# Patient Record
Sex: FEMALE | Race: WHITE
[De-identification: names, ages, dates, MRNs, and addresses within clinical notes are randomized per-mention and may not be internally consistent; named-entity substitution may affect disease eponyms.]

---

## 2023-01-24 ENCOUNTER — HOSPITAL ENCOUNTER (OUTPATIENT)
Dept: HOSPITAL 47 - EC | Age: 53
Setting detail: OBSERVATION
LOS: 1 days | Discharge: HOME | End: 2023-01-25
Attending: UROLOGY | Admitting: UROLOGY
Payer: MEDICARE

## 2023-01-24 VITALS — BODY MASS INDEX: 18.3 KG/M2

## 2023-01-24 VITALS — RESPIRATION RATE: 16 BRPM

## 2023-01-24 DIAGNOSIS — Z87.440: ICD-10-CM

## 2023-01-24 DIAGNOSIS — Z88.0: ICD-10-CM

## 2023-01-24 DIAGNOSIS — N13.6: Primary | ICD-10-CM

## 2023-01-24 DIAGNOSIS — F17.200: ICD-10-CM

## 2023-01-24 LAB
ALBUMIN SERPL-MCNC: 3.8 G/DL (ref 3.5–5)
ALP SERPL-CCNC: 134 U/L (ref 38–126)
ALT SERPL-CCNC: 17 U/L (ref 4–34)
ANION GAP SERPL CALC-SCNC: 11 MMOL/L
AST SERPL-CCNC: 17 U/L (ref 14–36)
BASOPHILS # BLD AUTO: 0.1 K/UL (ref 0–0.2)
BASOPHILS NFR BLD AUTO: 0 %
BUN SERPL-SCNC: 27 MG/DL (ref 7–17)
CALCIUM SPEC-MCNC: 9.2 MG/DL (ref 8.4–10.2)
CHLORIDE SERPL-SCNC: 102 MMOL/L (ref 98–107)
CO2 SERPL-SCNC: 24 MMOL/L (ref 22–30)
EOSINOPHIL # BLD AUTO: 0.2 K/UL (ref 0–0.7)
EOSINOPHIL NFR BLD AUTO: 1 %
ERYTHROCYTE [DISTWIDTH] IN BLOOD BY AUTOMATED COUNT: 4.21 M/UL (ref 3.8–5.4)
ERYTHROCYTE [DISTWIDTH] IN BLOOD: 12.4 % (ref 11.5–15.5)
GLUCOSE SERPL-MCNC: 109 MG/DL (ref 74–99)
HCT VFR BLD AUTO: 37.2 % (ref 34–46)
HGB BLD-MCNC: 12.5 GM/DL (ref 11.4–16)
KETONES UR QL STRIP.AUTO: (no result)
LIPASE SERPL-CCNC: 84 U/L (ref 23–300)
LYMPHOCYTES # SPEC AUTO: 1.1 K/UL (ref 1–4.8)
LYMPHOCYTES NFR SPEC AUTO: 6 %
MCH RBC QN AUTO: 29.8 PG (ref 25–35)
MCHC RBC AUTO-ENTMCNC: 33.7 G/DL (ref 31–37)
MCV RBC AUTO: 88.5 FL (ref 80–100)
MONOCYTES # BLD AUTO: 0.9 K/UL (ref 0–1)
MONOCYTES NFR BLD AUTO: 5 %
NEUTROPHILS # BLD AUTO: 14.5 K/UL (ref 1.3–7.7)
NEUTROPHILS NFR BLD AUTO: 86 %
PH UR: 7.5 [PH] (ref 5–8)
PLATELET # BLD AUTO: 323 K/UL (ref 150–450)
POTASSIUM SERPL-SCNC: 4.7 MMOL/L (ref 3.5–5.1)
PROT SERPL-MCNC: 6.9 G/DL (ref 6.3–8.2)
PROT UR QL: (no result)
RBC UR QL: >182 /HPF (ref 0–5)
SODIUM SERPL-SCNC: 137 MMOL/L (ref 137–145)
SP GR UR: 1.02 (ref 1–1.03)
SQUAMOUS UR QL AUTO: 17 /HPF (ref 0–4)
UROBILINOGEN UR QL STRIP: <2 MG/DL (ref ?–2)
WBC # BLD AUTO: 16.8 K/UL (ref 3.8–10.6)
WBC # UR AUTO: >182 /HPF (ref 0–5)

## 2023-01-24 PROCEDURE — 96365 THER/PROPH/DIAG IV INF INIT: CPT

## 2023-01-24 PROCEDURE — 74176 CT ABD & PELVIS W/O CONTRAST: CPT

## 2023-01-24 PROCEDURE — 85025 COMPLETE CBC W/AUTO DIFF WBC: CPT

## 2023-01-24 PROCEDURE — 96375 TX/PRO/DX INJ NEW DRUG ADDON: CPT

## 2023-01-24 PROCEDURE — 96361 HYDRATE IV INFUSION ADD-ON: CPT

## 2023-01-24 PROCEDURE — 36415 COLL VENOUS BLD VENIPUNCTURE: CPT

## 2023-01-24 PROCEDURE — 87186 SC STD MICRODIL/AGAR DIL: CPT

## 2023-01-24 PROCEDURE — 87077 CULTURE AEROBIC IDENTIFY: CPT

## 2023-01-24 PROCEDURE — 87086 URINE CULTURE/COLONY COUNT: CPT

## 2023-01-24 PROCEDURE — 81001 URINALYSIS AUTO W/SCOPE: CPT

## 2023-01-24 PROCEDURE — 96376 TX/PRO/DX INJ SAME DRUG ADON: CPT

## 2023-01-24 PROCEDURE — 80053 COMPREHEN METABOLIC PANEL: CPT

## 2023-01-24 PROCEDURE — 83690 ASSAY OF LIPASE: CPT

## 2023-01-24 PROCEDURE — 99285 EMERGENCY DEPT VISIT HI MDM: CPT

## 2023-01-24 PROCEDURE — 83605 ASSAY OF LACTIC ACID: CPT

## 2023-01-24 PROCEDURE — 87040 BLOOD CULTURE FOR BACTERIA: CPT

## 2023-01-24 RX ADMIN — KETOROLAC TROMETHAMINE PRN MG: 15 INJECTION, SOLUTION INTRAMUSCULAR; INTRAVENOUS at 23:36

## 2023-01-24 RX ADMIN — CEFAZOLIN SCH MLS/HR: 330 INJECTION, POWDER, FOR SOLUTION INTRAMUSCULAR; INTRAVENOUS at 23:36

## 2023-01-24 NOTE — CT
EXAMINATION TYPE: CT abdomen pelvis wo con

CT DLP: 272.4 mGycm, Automated exposure control for dose reduction was used.

 

DATE OF EXAM: 1/24/2023 9:28 PM

 

COMPARISON: None

 

CLINICAL INDICATION:Female, 52 years old with history of abdominal pain; left sided flank pain

 

TECHNIQUE:  Axial CT of the abdomen and pelvis. Sagittal and coronal reformats were created on a [employer] workstation. 

 

Contrast used: None

Oral contrast used: without Oral Contrast 

 

FINDINGS: 

LOWER CHEST: Left lower lobe calcified granuloma calcified granuloma

The esophagus in the lower mediastinum.

 

ABDOMEN

LIVER: Unremarkable

GALLBLADDER AND BILE DUCTS: The gallbladder surgically absent.

PANCREAS: Unremarkable.

SPLEEN: Unremarkable.

ADRENAL GLANDS: Unremarkable.

KIDNEYS AND URETERS: Staghorn calculus within the left renal pelvis extending into the lower calyces.
 There is associated mild to moderate dilation of the renal pelvis and calyces secondary to the large
 staghorn calculus in the renal pelvis.

 

Right kidney demonstrates no evidence for calculus or hydronephrosis.

 

PELVIS

BLADDER: Unremarkable

REPRODUCTIVE: Unremarkable.

 

ABDOMEN & PELVIS

STOMACH AND BOWEL: No evidence of bowel obstruction. 

PERITONEUM/RETROPERITONEUM: No evidence of pneumoperitoneum or free fluid.

.

VASCULATURE: No evidence of aortic aneurysm. 

MUSCULOSKELETAL: No acute osseous abnormalities, levoscoliosis apex L2.

LYMPH NODES: No gross evidence for lymphadenopathy.

SOFT TISSUE/ABDOMINAL WALL: Unremarkable

 

IMPRESSION:

Left staghorn calculus with mild to moderate left hydronephrosis predominately of the calyces. Urolog
ic consultation recommended

## 2023-01-24 NOTE — ED
Abdominal Pain HPI





- General


Chief Complaint: Abdominal Pain


Stated Complaint: Poss kidney stones


Time Seen by Provider: 01/24/23 20:31


Source: patient, RN notes reviewed


Mode of arrival: ambulatory


Limitations: no limitations





- History of Present Illness


Initial Comments: 


52-year-old female presents emergency department chief complaint left flank 

pain.  Patient states started approximately 10 days ago has severely worsened 

states that she cannot keep anything down.  Patient states symptoms are 

worsening daily and is are unremarkable this.  Patient denies any change in 

bowel habits states that she's had some change in color or urine.  Patient state

s pain rates her back always down into her groin.  No fevers or chills no chest 

pain.








- Related Data


                                    Allergies











Allergy/AdvReac Type Severity Reaction Status Date / Time


 


Penicillins Allergy  Vomiting Verified 01/24/23 20:26














Review of Systems


ROS Statement: 


Those systems with pertinent positive or pertinent negative responses have been 

documented in the HPI.





ROS Other: All systems not noted in ROS Statement are negative.





Past Medical History


Past Medical History: No Reported History


Past Surgical History: No Surgical Hx Reported


Past Psychological History: No Psychological Hx Reported


Smoking Status: Current every day smoker


Past Alcohol Use History: None Reported


Past Drug Use History: None Reported





General Exam


Limitations: no limitations


General appearance: alert, in no apparent distress


Head exam: Present: atraumatic, normocephalic, normal inspection


Eye exam: Present: normal appearance, PERRL, EOMI.  Absent: scleral icterus, 

conjunctival injection, periorbital swelling


ENT exam: Present: normal exam, normal oropharynx, mucous membranes moist


Neck exam: Present: normal inspection, full ROM.  Absent: tenderness, 

meningismus, lymphadenopathy


Respiratory exam: Present: normal lung sounds bilaterally.  Absent: respiratory 

distress, wheezes, rales, rhonchi, stridor


Cardiovascular Exam: Present: regular rate, normal rhythm, normal heart sounds. 

Absent: systolic murmur, diastolic murmur, rubs, gallop, clicks


GI/Abdominal exam: Present: soft, tenderness, normal bowel sounds.  Absent: 

distended, guarding, rebound, rigid


Back exam: Present: CVA tenderness (L)


Neurological exam: Present: alert


Skin exam: Present: warm, dry, intact, normal color.  Absent: rash





Course


                                   Vital Signs











  01/24/23





  20:23


 


Temperature 97.4 F L


 


Pulse Rate 100


 


Respiratory 20





Rate 


 


Blood Pressure 129/84


 


O2 Sat by Pulse 98





Oximetry 














Medical Decision Making





- Medical Decision Making


Was pt. sent in by a medical professional or institution (, PA, NP, urgent 

care, hospital, or nursing home...) When possible be specific


@  -[No]


Did you speak to anyone other than the patient for history (EMS, parent, family,

 police, friend...)? What history was obtained from this source 


@  -[No]


Did you review nursing and triage notes (agree or disagree)?  Why? 


@  -[I reviewed and agree with nursing and triage notes]


Were old charts reviewed (outside hosp., previous admission, EMS record, old 

EKG, old radiological studies, urgent care reports/EKG's, nursing home records)?

Report findings 


@  -[No old charts were reviewed]


Differential Diagnosis (chest pain, altered mental status, abdominal pain women,

abdominal pain men, vaginal bleeding, weakness, fever, dyspnea, syncope, 

headache, dizziness, GI bleed, back pain, seizure, CVA, palpatations, mental 

health)? 


@  -[Pyelonephritis UTI, kidney stone, colitis, this list is not all inclusive.]


EKG interpreted by me (3pts min.).


@  -[None]


X-rays interpreted by me (1pt min.).


@  -[None done]


CT interpreted by me (1pt min.).


@  -[CT shows large staghorn telecast left kidney with hydronephrosis]


U/S interpreted by me (1pt. min.).


@  -[None done]


What testing was considered but not performed or refused? (CT, X-rays, U/S, 

labs)? Why?


@  -[None]


What meds were considered but not given or refused? Why?


@  -[None]


Did you discuss the management of the patient with other professionals 

(professionals i.e. , PA, NP, lab, RT, psych nurse, , , 

teacher, , )? Give summary


@  -[Neurology Dr. Lopez]


Was smoking cessation discussed for >3mins.?


@  -[No]


Was critical care preformed (if so, how long)?


@  -[No]


Were there social determinants of health that impacted care today? How? 

(Homelessness, low income, unemployed, alcoholism, drug addiction, 

transportation, low edu. Level, literacy, decrease access to med. care, intermediate, 

rehab)?


@  -[No]


Was there de-escalation of care discussed even if they declined (Discuss DNR or 

withdrawal of care, Hospice)? DNR status


@  -[No]


What co-morbidities impacted this encounter? (DM, HTN, Smoking, COPD, CAD, 

Cancer, CVA, ARF, Chemo, Hep., AIDS, mental health diagnosis, sleep apnea, 

morbid obesity)?


@  -[None]


Was patient admitted / discharged? Hospital course, mention meds given and 

route, prescriptions, significant lab abnormalities, going to OR and other 

pertinent info.


@  -[Admitted patient has evidence of large staghorn calculus with 

hydronephrosis, UTI moderate leukocytosis and subjective fever concerns for 

possible septic stone, pyelonephritis.] 


Undiagnosed new problem with uncertain prognosis?


@  -[No]


Drug Therapy requiring intensive monitoring for toxicity (Heparin, Nitro, 

Insulin, Cardizem)?


@  -[No]


Were any procedures done?


@  -[No]


Diagnosis/symptom?


@  -[Septic kidney stone]


Acute, or Chronic, or Acute on Chronic?


@  -[Acute]


Uncomplicated (without systemic symptoms) or Complicated (systemic symptoms)?


@  -[Complicated]


Side effects of treatment?


@  -[No]


Exacerbation, Progression, or Severe Exacerbation?


@  -[No]


Poses a threat to life or bodily function? How? (Chest pain, USA, MI, pneumonia,

 PE, COPD, DKA, ARF, appy, cholecystitis, CVA, Diverticulitis, Homicidal, 

Suicidal, threat to staff... and all critical care pts)


@  -[Yes patient has complicated kidney infection with kidney stone]








- Lab Data


Result diagrams: 


                                 01/24/23 21:06





                                 01/24/23 21:06


                                   Lab Results











  01/24/23 01/24/23 01/24/23 Range/Units





  21:06 21:06 21:06 


 


WBC  16.8 H    (3.8-10.6)  k/uL


 


RBC  4.21    (3.80-5.40)  m/uL


 


Hgb  12.5    (11.4-16.0)  gm/dL


 


Hct  37.2    (34.0-46.0)  %


 


MCV  88.5    (80.0-100.0)  fL


 


MCH  29.8    (25.0-35.0)  pg


 


MCHC  33.7    (31.0-37.0)  g/dL


 


RDW  12.4    (11.5-15.5)  %


 


Plt Count  323    (150-450)  k/uL


 


MPV  8.1    


 


Neutrophils %  86    %


 


Lymphocytes %  6    %


 


Monocytes %  5    %


 


Eosinophils %  1    %


 


Basophils %  0    %


 


Neutrophils #  14.5 H    (1.3-7.7)  k/uL


 


Lymphocytes #  1.1    (1.0-4.8)  k/uL


 


Monocytes #  0.9    (0-1.0)  k/uL


 


Eosinophils #  0.2    (0-0.7)  k/uL


 


Basophils #  0.1    (0-0.2)  k/uL


 


Sodium   137   (137-145)  mmol/L


 


Potassium   4.7   (3.5-5.1)  mmol/L


 


Chloride   102   ()  mmol/L


 


Carbon Dioxide   24   (22-30)  mmol/L


 


Anion Gap   11   mmol/L


 


BUN   27 H   (7-17)  mg/dL


 


Creatinine   0.98   (0.52-1.04)  mg/dL


 


Est GFR (CKD-EPI)AfAm   77   (>60 ml/min/1.73 sqM)  


 


Est GFR (CKD-EPI)NonAf   67   (>60 ml/min/1.73 sqM)  


 


Glucose   109 H   (74-99)  mg/dL


 


Plasma Lactic Acid Cosme    0.9  (0.7-2.0)  mmol/L


 


Calcium   9.2   (8.4-10.2)  mg/dL


 


Total Bilirubin   0.6   (0.2-1.3)  mg/dL


 


AST   17   (14-36)  U/L


 


ALT   17   (4-34)  U/L


 


Alkaline Phosphatase   134 H   ()  U/L


 


Total Protein   6.9   (6.3-8.2)  g/dL


 


Albumin   3.8   (3.5-5.0)  g/dL


 


Lipase   84   ()  U/L


 


Urine Color     


 


Urine Appearance     (Clear)  


 


Urine pH     (5.0-8.0)  


 


Ur Specific Gravity     (1.001-1.035)  


 


Urine Protein     (Negative)  


 


Urine Glucose (UA)     (Negative)  


 


Urine Ketones     (Negative)  


 


Urine Blood     (Negative)  


 


Urine Nitrite     (Negative)  


 


Urine Bilirubin     (Negative)  


 


Urine Urobilinogen     (<2.0)  mg/dL


 


Ur Leukocyte Esterase     (Negative)  


 


Urine RBC     (0-5)  /hpf


 


Urine WBC     (0-5)  /hpf


 


Urine WBC Clumps     (None)  /hpf


 


Ur Squamous Epith Cells     (0-4)  /hpf


 


Urine Bacteria     (None)  /hpf


 


Urine Mucus     (None)  /hpf














  01/24/23 Range/Units





  22:13 


 


WBC   (3.8-10.6)  k/uL


 


RBC   (3.80-5.40)  m/uL


 


Hgb   (11.4-16.0)  gm/dL


 


Hct   (34.0-46.0)  %


 


MCV   (80.0-100.0)  fL


 


MCH   (25.0-35.0)  pg


 


MCHC   (31.0-37.0)  g/dL


 


RDW   (11.5-15.5)  %


 


Plt Count   (150-450)  k/uL


 


MPV   


 


Neutrophils %   %


 


Lymphocytes %   %


 


Monocytes %   %


 


Eosinophils %   %


 


Basophils %   %


 


Neutrophils #   (1.3-7.7)  k/uL


 


Lymphocytes #   (1.0-4.8)  k/uL


 


Monocytes #   (0-1.0)  k/uL


 


Eosinophils #   (0-0.7)  k/uL


 


Basophils #   (0-0.2)  k/uL


 


Sodium   (137-145)  mmol/L


 


Potassium   (3.5-5.1)  mmol/L


 


Chloride   ()  mmol/L


 


Carbon Dioxide   (22-30)  mmol/L


 


Anion Gap   mmol/L


 


BUN   (7-17)  mg/dL


 


Creatinine   (0.52-1.04)  mg/dL


 


Est GFR (CKD-EPI)AfAm   (>60 ml/min/1.73 sqM)  


 


Est GFR (CKD-EPI)NonAf   (>60 ml/min/1.73 sqM)  


 


Glucose   (74-99)  mg/dL


 


Plasma Lactic Acid Cosme   (0.7-2.0)  mmol/L


 


Calcium   (8.4-10.2)  mg/dL


 


Total Bilirubin   (0.2-1.3)  mg/dL


 


AST   (14-36)  U/L


 


ALT   (4-34)  U/L


 


Alkaline Phosphatase   ()  U/L


 


Total Protein   (6.3-8.2)  g/dL


 


Albumin   (3.5-5.0)  g/dL


 


Lipase   ()  U/L


 


Urine Color  Yellow  


 


Urine Appearance  Turbid H  (Clear)  


 


Urine pH  7.5  (5.0-8.0)  


 


Ur Specific Gravity  1.022  (1.001-1.035)  


 


Urine Protein  3+ H  (Negative)  


 


Urine Glucose (UA)  Negative  (Negative)  


 


Urine Ketones  2+ H  (Negative)  


 


Urine Blood  Large H  (Negative)  


 


Urine Nitrite  Positive H  (Negative)  


 


Urine Bilirubin  Negative  (Negative)  


 


Urine Urobilinogen  <2.0  (<2.0)  mg/dL


 


Ur Leukocyte Esterase  Large H  (Negative)  


 


Urine RBC  >182 H  (0-5)  /hpf


 


Urine WBC  >182 H  (0-5)  /hpf


 


Urine WBC Clumps  Many H  (None)  /hpf


 


Ur Squamous Epith Cells  17 H  (0-4)  /hpf


 


Urine Bacteria  Many H  (None)  /hpf


 


Urine Mucus  Few H  (None)  /hpf














Disposition


Clinical Impression: 


 UTI (urinary tract infection), Staghorn renal calculus, Leukocytosis





Disposition: ADMITTED AS IP TO THIS HOSP


Condition: Fair


Referrals: 


None,Stated [Primary Care Provider] - 1-2 days


Time of Disposition: 23:08

## 2023-01-25 VITALS — DIASTOLIC BLOOD PRESSURE: 74 MMHG | SYSTOLIC BLOOD PRESSURE: 135 MMHG | HEART RATE: 111 BPM | TEMPERATURE: 98 F

## 2023-01-25 RX ADMIN — KETOROLAC TROMETHAMINE PRN MG: 15 INJECTION, SOLUTION INTRAMUSCULAR; INTRAVENOUS at 12:09

## 2023-01-25 RX ADMIN — CEFAZOLIN SCH: 330 INJECTION, POWDER, FOR SOLUTION INTRAMUSCULAR; INTRAVENOUS at 12:08

## 2023-01-25 NOTE — P.GSHP
History of Present Illness


H&P Date: 01/25/23


Chief Complaint: Left renal stone





The patient is a 52-year-old female with no reported past medical history.  She 

is a current smoker.  She presented to the emergency department on 1/24/23 with 

complaints of left flank pain that radiates to her left groin.  Her pain started

approximately 10 days ago and progressively got worse with associated nausea and

vomiting.  She denies any fever but has had some chills.  Her urinalysis is 

consistent with a UTI.  WBC 16.8.  Urine culture and blood cultures are pending.

 Abdomen/pelvis CT shows left staghorn calculus with mild to moderate left 

hydronephrosis predominantly of the calyces.  The patient is afebrile, 

creatinine is 0.98.  She reports her pain is being well controlled this am.  No 

previous history of kidney stones.  She denies any dysuria or hematuria.  She 

does report a history of recurrent UTIs. 





Past Medical History


Past Medical History: No Reported History


Past Surgical History: No Surgical Hx Reported


Past Psychological History: No Psychological Hx Reported


Smoking Status: Current every day smoker


Past Alcohol Use History: None Reported


Past Drug Use History: None Reported





Medications and Allergies


                                Home Medications











 Medication  Instructions  Recorded  Confirmed  Type


 


Cephalexin [Keflex] 500 mg PO Q8HR 14 Days #42 cap 01/25/23  Rx


 


Ketorolac [Toradol] 10 mg PO Q6HR PRN #15 tab 01/25/23  Rx


 


Ondansetron [Zofran] 4 mg PO Q8HR PRN #10 tab 01/25/23  Rx








                                    Allergies











Allergy/AdvReac Type Severity Reaction Status Date / Time


 


Penicillins AdvReac  Vomiting Verified 01/25/23 07:30














Surgical - Exam


                                   Vital Signs











Temp Pulse Resp BP Pulse Ox


 


 97.4 F L  100   20   129/84   98 


 


 01/24/23 20:23  01/24/23 20:23  01/24/23 20:23  01/24/23 20:23  01/24/23 20:23














- General


no distress, no pain





- Eyes


normal ocular movement, no pale





- ENT


normal nares, normal mucosa





- Respiratory


normal expansion, normal respiratory effort





- Abdomen


Abdomen: soft, tender (Left CVA tenderness), no distended





- Psychiatric


oriented to time, oriented to person, oriented to place





Results





- Labs





                                 01/24/23 21:06





                                 01/24/23 21:06


                  Abnormal Lab Results - Last 24 Hours (Table)











  01/24/23 01/24/23 01/24/23 Range/Units





  21:06 21:06 22:13 


 


WBC  16.8 H    (3.8-10.6)  k/uL


 


Neutrophils #  14.5 H    (1.3-7.7)  k/uL


 


BUN   27 H   (7-17)  mg/dL


 


Glucose   109 H   (74-99)  mg/dL


 


Alkaline Phosphatase   134 H   ()  U/L


 


Urine Appearance    Turbid H  (Clear)  


 


Urine Protein    3+ H  (Negative)  


 


Urine Ketones    2+ H  (Negative)  


 


Urine Blood    Large H  (Negative)  


 


Urine Nitrite    Positive H  (Negative)  


 


Ur Leukocyte Esterase    Large H  (Negative)  


 


Urine RBC    >182 H  (0-5)  /hpf


 


Urine WBC    >182 H  (0-5)  /hpf


 


Urine WBC Clumps    Many H  (None)  /hpf


 


Ur Squamous Epith Cells    17 H  (0-4)  /hpf


 


Urine Bacteria    Many H  (None)  /hpf


 


Urine Mucus    Few H  (None)  /hpf








                      Microbiology - Last 24 Hours (Table)











 01/24/23 23:45 Blood Culture - Final





 Blood 


 


 01/24/23 22:13 Urine Culture - Preliminary





 Urine,Voided 








                                 Diabetes panel











  01/24/23 Range/Units





  21:06 


 


Sodium  137  (137-145)  mmol/L


 


Potassium  4.7  (3.5-5.1)  mmol/L


 


Chloride  102  ()  mmol/L


 


Carbon Dioxide  24  (22-30)  mmol/L


 


BUN  27 H  (7-17)  mg/dL


 


Creatinine  0.98  (0.52-1.04)  mg/dL


 


Glucose  109 H  (74-99)  mg/dL


 


Calcium  9.2  (8.4-10.2)  mg/dL


 


AST  17  (14-36)  U/L


 


ALT  17  (4-34)  U/L


 


Alkaline Phosphatase  134 H  ()  U/L


 


Total Protein  6.9  (6.3-8.2)  g/dL


 


Albumin  3.8  (3.5-5.0)  g/dL








                                  Calcium panel











  01/24/23 Range/Units





  21:06 


 


Calcium  9.2  (8.4-10.2)  mg/dL


 


Albumin  3.8  (3.5-5.0)  g/dL








                                 Pituitary panel











  01/24/23 Range/Units





  21:06 


 


Sodium  137  (137-145)  mmol/L


 


Potassium  4.7  (3.5-5.1)  mmol/L


 


Chloride  102  ()  mmol/L


 


Carbon Dioxide  24  (22-30)  mmol/L


 


BUN  27 H  (7-17)  mg/dL


 


Creatinine  0.98  (0.52-1.04)  mg/dL


 


Glucose  109 H  (74-99)  mg/dL


 


Calcium  9.2  (8.4-10.2)  mg/dL








                                  Adrenal panel











  01/24/23 Range/Units





  21:06 


 


Sodium  137  (137-145)  mmol/L


 


Potassium  4.7  (3.5-5.1)  mmol/L


 


Chloride  102  ()  mmol/L


 


Carbon Dioxide  24  (22-30)  mmol/L


 


BUN  27 H  (7-17)  mg/dL


 


Creatinine  0.98  (0.52-1.04)  mg/dL


 


Glucose  109 H  (74-99)  mg/dL


 


Calcium  9.2  (8.4-10.2)  mg/dL


 


Total Bilirubin  0.6  (0.2-1.3)  mg/dL


 


AST  17  (14-36)  U/L


 


ALT  17  (4-34)  U/L


 


Alkaline Phosphatase  134 H  ()  U/L


 


Total Protein  6.9  (6.3-8.2)  g/dL


 


Albumin  3.8  (3.5-5.0)  g/dL














- Imaging


CT scan - abdomen: image reviewed (Large left-sided staghorn)





Assessment and Plan


Assessment: 





This 52-year-old female with a large left-sided staghorn calculi, causing 

recurrent UTIs, hydronephrosis and pain.  Discussed with her the next step would

 be to treat her UTI, discussed that most likely the source of recurrent UTIs is

 her staghorn calculi.  Discussed after  resolution of the UTIs she will be set 

up for a left-sided PCNL.  Aware of the risk of surgery which includes bleeding,

 infection, injury to nearby organs which includes spleen, lung and bowel.


-Okay for discharge home today-


-follow up on urine culture, will discharge home on 2 weeks of antibiotics


-We'll arrange for outpatient left-sided PCNL


(1) Staghorn renal calculus


Status: Acute   Code(s): N20.0 - CALCULUS OF KIDNEY   SNOMED Code(s): 016121873


   





(2) UTI (urinary tract infection)


Status: Acute   Code(s): N39.0 - URINARY TRACT INFECTION, SITE NOT SPECIFIED   

SNOMED Code(s): 35725105

## 2023-01-25 NOTE — P.DS
Providers


Date of admission: 


01/24/23 23:08





Expected date of discharge: 01/25/23


Attending physician: 


Odell Lopez MD





Primary care physician: 


Stated None








- Discharge Diagnosis(es)


(1) Staghorn renal calculus


Status: Acute   





(2) UTI (urinary tract infection)


Status: Acute   


Hospital Course: 


The patient is a 52-year-old female with no reported past medical history.  She 

is a current smoker.  She presented to the emergency department on 1/24/23 with 

complaints of left flank pain that radiates to her left groin.  Her pain started

approximately 10 days ago and progressively got worse with associated nausea and

vomiting.  She denies any fever but has had some chills.  Her urinalysis is 

consistent with a UTI.  WBC 16.8.  Urine culture and blood cultures are pending.

 Abdomen/pelvis CT shows left staghorn calculus with mild to moderate left 

hydronephrosis predominantly of the calyces.  The patient is afebrile, 

creatinine is 0.98.  She reports her pain is being well controlled.  No previous

history of kidney stones.  She denies any dysuria or hematuria.  She does report

a history of recurrent UTIs.  The patient is discharged home on Keflex 500 mg 3 

times a day for 2 weeks to clear her current UTI.  She will  be scheduled by our

office for an outpatient percutaneous nephrolithotomy.  The patient is in 

agreement with this plan.  Prescriptions were sent to her pharmacy for Keflex, 

Zofran, and Toradol.





Impression and plan of care have been directed as dictated by the signing 

physician.  Jennie Bustos nurse practitioner acting as scribe for signing 

physician.





Jennie Bustos [facility]


Palliative Care/Urology


Spectralink [id number]


Email: [email or web address]


I personally performed and participated in the history, physical, the decision 

making, I agree with the assessment and plan of NP





Patient Condition at Discharge: Good





Plan - Discharge Summary


New Discharge Prescriptions: 


New


   Ondansetron [Zofran] 4 mg PO Q8HR PRN #10 tab


     PRN Reason: Nausea


   Ketorolac [Toradol] 10 mg PO Q6HR PRN #15 tab


     PRN Reason: Pain


   Cephalexin [Keflex] 500 mg PO Q8HR 14 Days #42 cap





Discontinued


   Ondansetron [Zofran] 4 mg PO Q8H PRN


     PRN Reason: Nausea And Vomiting


   Naproxen [Naprosyn] 500 mg PO Q12H PRN


     PRN Reason: Pain


Discharge Medication List





Cephalexin [Keflex] 500 mg PO Q8HR 14 Days #42 cap 01/25/23 [Rx]


Ketorolac [Toradol] 10 mg PO Q6HR PRN #15 tab 01/25/23 [Rx]


Ondansetron [Zofran] 4 mg PO Q8HR PRN #10 tab 01/25/23 [Rx]








Follow up Appointment(s)/Referral(s): 


None,Stated [Primary Care Provider] - 1-2 days


Patient Instructions/Handouts:  Kidney Stones (DC), Urinary Tract Infection in 

Women (DC)


Activity/Diet/Wound Care/Special Instructions: 


- Dr. Lopez's office will contact you to schedule your surgery


- Take medication prescribed for pain as needed


- Do not take Toradol and Naprosyn at the same time


- Take antibiotic, Keflex, 3 times a day for 2 weeks


- Activity as tolerated





Discharge Disposition: HOME SELF-CARE

## 2023-03-09 NOTE — P.HPIHPCON
History of Present Illness


H&P Date: 03/09/23


Chief Complaint: Left-sided staghorn calculi





This is a 52-year-old female with history of recurrent UTIs, underwent a CT 

abdomen and pelvis that showed evidence of left-sided staghorn calculi, causing 

hydronephrosis.  Discussed with her given the finding I do recommend proceeding 

with left-sided PCNL.  An alternative of surgery was discussed in detail.  

Discussed risk which includes but not limited to bleeding, infection, injury to 

nearby organs which includes but not limited to the spleen, bowel, lung.  Risk 

of anesthesia was also discussed with her in detail.  Discussed given the 

significant stone burden is a potential that this might need to be a staged 

PCNL.  She understood all the risk and agreed to proceed


Consent for Procedure: 


I have explained the operation/procedure to the patient, including the risks, 

benefits, side effects, alternative therapies (including not receiving the 

proposed treatment or service), the likelihood of the patient achieving his/her 

goals, and potential recuperation problems for the procedure/sedation/analgesia,

as well as any blood products, if indicated. I also explained to the patient the

risks, benefits and side effects of the alternatives, as well as the risks 

related to not receiving the proposed procedure, care, treatment, or services.








Past Medical History


Past Medical History: No Reported History


Additional Past Medical History / Comment(s): kidney stones left side, admitted 

1-24-1-25-23 for UTI/kidney stones


History of Any Multi-Drug Resistant Organisms: None Reported


Past Surgical History: Cholecystectomy


Additional Past Surgical History / Comment(s): rt ureter stent placed 1993-later

removed


Past Anesthesia/Blood Transfusion Reactions: No Reported Reaction


Additional Past Anesthesia/Blood Transfusion Reaction / Comment(s): no hx blood 

transfusion


Smoking Status: Current every day smoker





- Past Family History


  ** Mother


Family Medical History: No Reported History





  ** Father


Family Medical History: Cancer


Additional Family Medical History / Comment(s): prostate CA.  paternal 

grandmother-breast CA





Medications and Allergies


                                Home Medications











 Medication  Instructions  Recorded  Confirmed  Type


 


Acetaminophen Tab [Tylenol Tab] 1,000 mg PO Q6HR PRN 03/09/23 03/09/23 History








                                    Allergies











Allergy/AdvReac Type Severity Reaction Status Date / Time


 


Penicillins AdvReac  Vomiting Verified 03/09/23 08:37














Surgical - Exam





- General


no distress, no pain





- Eyes


normal ocular movement, no pale





- ENT


normal nares, normal mucosa





- Respiratory


normal expansion, normal respiratory effort





- Abdomen


Abdomen: soft, non tender





Assessment and Plan


Assessment: 





OR for left-sided PCNL

## 2023-03-14 ENCOUNTER — HOSPITAL ENCOUNTER (OUTPATIENT)
Dept: HOSPITAL 47 - OR | Age: 53
LOS: 1 days | Discharge: HOME | End: 2023-03-15
Attending: UROLOGY
Payer: SELF-PAY

## 2023-03-14 VITALS — RESPIRATION RATE: 16 BRPM

## 2023-03-14 VITALS — BODY MASS INDEX: 18.3 KG/M2

## 2023-03-14 DIAGNOSIS — N20.0: Primary | ICD-10-CM

## 2023-03-14 DIAGNOSIS — F17.200: ICD-10-CM

## 2023-03-14 PROCEDURE — 86850 RBC ANTIBODY SCREEN: CPT

## 2023-03-14 PROCEDURE — 50080 PERQ NL/PL LITHOTRP SMPL<2CM: CPT

## 2023-03-14 PROCEDURE — 52281 CYSTOSCOPY AND TREATMENT: CPT

## 2023-03-14 PROCEDURE — 86901 BLOOD TYPING SEROLOGIC RH(D): CPT

## 2023-03-14 PROCEDURE — 86900 BLOOD TYPING SEROLOGIC ABO: CPT

## 2023-03-14 PROCEDURE — 50432 PLMT NEPHROSTOMY CATHETER: CPT

## 2023-03-14 PROCEDURE — 74018 RADEX ABDOMEN 1 VIEW: CPT

## 2023-03-14 PROCEDURE — 82365 CALCULUS SPECTROSCOPY: CPT

## 2023-03-14 RX ADMIN — CIPROFLOXACIN SCH MG: 500 TABLET, FILM COATED ORAL at 13:20

## 2023-03-14 RX ADMIN — HEPARIN SODIUM SCH UNIT: 5000 INJECTION INTRAVENOUS; SUBCUTANEOUS at 15:57

## 2023-03-14 RX ADMIN — POTASSIUM CHLORIDE SCH MLS/HR: 14.9 INJECTION, SOLUTION INTRAVENOUS at 13:16

## 2023-03-14 RX ADMIN — POTASSIUM CHLORIDE SCH: 14.9 INJECTION, SOLUTION INTRAVENOUS at 20:35

## 2023-03-14 RX ADMIN — KETOROLAC TROMETHAMINE SCH MG: 15 INJECTION, SOLUTION INTRAMUSCULAR; INTRAVENOUS at 17:52

## 2023-03-14 RX ADMIN — CIPROFLOXACIN SCH MG: 500 TABLET, FILM COATED ORAL at 20:31

## 2023-03-14 RX ADMIN — HYDROMORPHONE HYDROCHLORIDE PRN MG: 1 INJECTION, SOLUTION INTRAMUSCULAR; INTRAVENOUS; SUBCUTANEOUS at 22:04

## 2023-03-14 RX ADMIN — HYDROMORPHONE HYDROCHLORIDE PRN MG: 1 INJECTION, SOLUTION INTRAMUSCULAR; INTRAVENOUS; SUBCUTANEOUS at 15:57

## 2023-03-14 RX ADMIN — KETOROLAC TROMETHAMINE SCH MG: 15 INJECTION, SOLUTION INTRAMUSCULAR; INTRAVENOUS at 12:36

## 2023-03-14 NOTE — XR
EXAMINATION TYPE: XR KUB

 

DATE OF EXAM: 3/14/2023

 

COMPARISON: NONE

 

HISTORY: Preop kidney stone

 

TECHNIQUE: Single view

 

FINDINGS: There is amorphous large area of calcification over the left kidney. This measures 6 x 1 cm
. The bowel gas pattern is nonacute. There are clips from cholecystectomy.

 

IMPRESSION: Large left renal calculus.

## 2023-03-14 NOTE — FL
EXAMINATION TYPE: FL Perc Nephrostomy New Access

 

DATE OF EXAM: 3/14/2023

 

COMPARISON: NONE

 

HISTORY: Left renal stone  

 

Procedure had been discussed with the patient by Dr. Lam,  risks, benefits, alternatives, were dis
cussed and any questions were answered.  Informed consent was obtained.  The patient was in a semipro
ne position prepped and draped on the OR table in the usual sterile fashion.  Utilizing a 15 cm lengt
h Chiba needle a single pass was made into a lower pole posterior calyx under fluoroscopic guidance. 
 An 0.018 guidewire is passed through the needle and there was placement of a 6-[place] catheter sheat
h system.  There was conversion to a  0.035 system was performed with passage of a guidewire into the
 ureter utilizing a directional catheter.  A second safety wire was placed.  Remaining portion of pro
cedure performed by .   Approximately 1 minute and 41 seconds of fluoroscopy was provided.  


dap .[id number]

 

IMPRESSION:

1. Successful intraoperative left nephrostomy prior to nephrolithotomy.

## 2023-03-14 NOTE — P.OP
Date of Procedure: 03/14/23


Preoperative Diagnosis: 


Left-sided renal stone


Postoperative Diagnosis: 


Same


Procedure(s) Performed: 


Left-sided percutaneous nephrolithotomy, cystoscopy, left ureteral 

catheterization


Implants: 


None


Anesthesia: GETA


Surgeon: Odell Lopez


Estimated Blood Loss (ml): 100


Pathology: other (left renal stone)


Condition: stable


Disposition: PACU


Indications for Procedure: 


This is a 52-year-old female with history of recurrent UTIs, underwent a CT 

abdomen and pelvis that showed evidence of left-sided staghorn calculi, causing 

hydronephrosis.  Discussed with her given the finding I do recommend proceeding 

with left-sided PCNL.  An alternative of surgery was discussed in detail.  

Discussed risk which includes but not limited to bleeding, infection, injury to 

nearby organs which includes but not limited to the spleen, bowel, lung.  Risk 

of anesthesia was also discussed with her in detail.  Discussed given the 

significant stone burden is a potential that this might need to be a staged 

PCNL.  She understood all the risk and agreed to proceed


Operative Findings: 


large left sided renal pelvis renal stone and multiple left  lower pole renal 

stone  


Description of Procedure: 


Patient brought to the operating room, general anesthesia was induced.  She was 

prepped and draped in the bed for leg position.  Cystoscopy fitted 21-Citizen of [place] 

sheath was inserted per urethra, cystoscopy was performed which showed no 

abnormality within the bladder.  The left ureter was intubated, and the left 

ureteral occlusion catheter was advanced up the left ureter and into the renal 

pelvis.  Next a De La O catheter was placed, and the ureteral catheter was secured

to the De La O catheter. Patient was placed in a prone position, all pressure 

points were padded.  Next the flank was prepped, next access was obtained by Dr alvarez, it was an upper pole access below the 12th rib.  After 2 wires were 

down the ureter, next a ureteral balloon dilator was passed over the wire under 

fluoroscopy the tract was dilated under fluoroscopy.  Next a 30-Citizen [place] access 

sheath was passed over the balloon dilator.  Next the nephroscope was inserted 

through the access sheath, a large friable stone was seen in the renal pelvis 

fragmented using the ultrasound lithotripter, the fragments were removed using 

the grasper.  There was an additional stone in the lower pole which was 

fragmented using the ultrasound lithotripter, the stone was removed using the 

grasper.  Repeat renoscopy showed no additional stones, on fluoroscopy there was

a small stone in the lower pole.  At this time I switched to a flexible 

cystoscope and I was able to visualize the stone, using the stone basket stone 

was grasped and removed intact.  Repeat renoscopy showed no stone, on 

fluoroscopy and was no other radiopaque densities.  At this time a 12-[place]izen [place] 

nephrostomy tube was passed over the wire, antegrade nephrostogram was performed

which showed no filling defect, or evidence of extravasation.  There was 

contrast seen going down the ureter.  At this time the subcutaneous tissue was 

closed using 3-0 Vicryl.  Skin was closed using 3-0 Vicryl along the medial edge

2-0 silk on the lateral edge, the tube was secured to the silk stitches.  

Sterile dressing was applied.  patient was awakened from anesthesia. Patient was

taken to recovery in stable condition

## 2023-03-15 VITALS — HEART RATE: 82 BPM | DIASTOLIC BLOOD PRESSURE: 69 MMHG | TEMPERATURE: 99.8 F | SYSTOLIC BLOOD PRESSURE: 117 MMHG

## 2023-03-15 RX ADMIN — HYDROMORPHONE HYDROCHLORIDE PRN MG: 1 INJECTION, SOLUTION INTRAMUSCULAR; INTRAVENOUS; SUBCUTANEOUS at 10:43

## 2023-03-15 RX ADMIN — POTASSIUM CHLORIDE SCH: 14.9 INJECTION, SOLUTION INTRAVENOUS at 15:46

## 2023-03-15 RX ADMIN — KETOROLAC TROMETHAMINE SCH MG: 15 INJECTION, SOLUTION INTRAMUSCULAR; INTRAVENOUS at 05:32

## 2023-03-15 RX ADMIN — KETOROLAC TROMETHAMINE SCH MG: 15 INJECTION, SOLUTION INTRAMUSCULAR; INTRAVENOUS at 13:09

## 2023-03-15 RX ADMIN — CIPROFLOXACIN SCH MG: 500 TABLET, FILM COATED ORAL at 08:53

## 2023-03-15 RX ADMIN — HYDROMORPHONE HYDROCHLORIDE PRN MG: 1 INJECTION, SOLUTION INTRAMUSCULAR; INTRAVENOUS; SUBCUTANEOUS at 05:32

## 2023-03-15 RX ADMIN — KETOROLAC TROMETHAMINE SCH MG: 15 INJECTION, SOLUTION INTRAMUSCULAR; INTRAVENOUS at 00:44

## 2023-03-15 RX ADMIN — POTASSIUM CHLORIDE SCH MLS/HR: 14.9 INJECTION, SOLUTION INTRAVENOUS at 08:45

## 2023-03-15 RX ADMIN — HEPARIN SODIUM SCH UNIT: 5000 INJECTION INTRAVENOUS; SUBCUTANEOUS at 00:45

## 2023-03-15 RX ADMIN — HEPARIN SODIUM SCH UNIT: 5000 INJECTION INTRAVENOUS; SUBCUTANEOUS at 08:53

## 2023-03-15 NOTE — P.DS
Providers


Expected date of discharge: 03/15/23


Attending physician: 


Odell Lopez MD





Primary care physician: 


Stated None








- Discharge Diagnosis(es)


(1) Staghorn renal calculus


Status: Acute   


Hospital Course: 


The patient is a 52-year-old female with history of recurrent UTIs and kidney 

stones.  She underwent a CT abdomen/pelvis that showed evidence of left sided 

staghorn calculi causing hydronephrosis.  On 3/14/23 she underwent a left-sided 

percutaneous nephrolithotomy, cystoscopy, left ureteral catheterization with Dr. Lopez.  She tolerated the procedure well and was taken to the recovery room in 

good condition. POD #1 she denies any nausea or vomiting and is tolerating a 

regular diet. Her De La O catheter was draining light red urine.  It has been 

removed and she is able to void without difficulty.  Left sided nephrostomy 

draining light red urine with dressing clean dry and intact. She states that her

pain is well controlled.  She was instructed to finish the antibiotics that she 

has at home.  She is to follow up in our office in one week with Dr. Lopez.





Impression and plan of care have been directed as dictated by the signing 

physician.  Jennie Bustos nurse practitioner acting as scribe for signing 

physician.





Jennie Bustos [facility]


Palliative Care/Urology


Spectralink [id number]


Email: [email or web address]


I personally performed and participated in the history, physical, the decision 

making, I agree with the assessment and plan of NP





Patient Condition at Discharge: Good





Plan - Discharge Summary


Discharge Rx Participant: No


New Discharge Prescriptions: 


New


   Ketorolac [Toradol] 10 mg PO Q6HR PRN #15 tab


     PRN Reason: Pain





Continue


   Acetaminophen Tab [Tylenol] 1,000 mg PO Q6HR PRN


     PRN Reason: Pain


Discharge Medication List





Acetaminophen Tab [Tylenol] 1,000 mg PO Q6HR PRN 03/09/23 [History]


Ketorolac [Toradol] 10 mg PO Q6HR PRN #15 tab 03/15/23 [Rx]








Follow up Appointment(s)/Referral(s): 


Odell Lopez MD [STAFF PHYSICIAN] - 03/20/23 9:40 am


Patient Instructions/Handouts:  Ketorolac (By mouth), Surgical Site Infections 

(DC), Nephrostomy Tube Care (DC)


Activity/Diet/Wound Care/Special Instructions: 


- FINISH THE ANTIBIOTIC THAT YOU HAVE AT HOME


- No heavy lifting, straining, or strenuous activity


- Take pain medication as prescribed for discomfort


- You may shower, no tub baths


- It is normal to have blood in your urine


- Increase your fluid intake


- You may change the dressing around the tube as needed


- Follow up with the physician in the office for tube removal


Discharge Disposition: HOME SELF-CARE

## 2024-06-12 ENCOUNTER — APPOINTMENT (RX ONLY)
Dept: URBAN - METROPOLITAN AREA CLINIC 138 | Facility: CLINIC | Age: 54
Setting detail: DERMATOLOGY
End: 2024-06-12

## 2024-06-12 VITALS — HEIGHT: 55 IN | WEIGHT: 115 LBS

## 2024-06-12 DIAGNOSIS — D485 NEOPLASM OF UNCERTAIN BEHAVIOR OF SKIN: ICD-10-CM

## 2024-06-12 PROBLEM — D48.5 NEOPLASM OF UNCERTAIN BEHAVIOR OF SKIN: Status: ACTIVE | Noted: 2024-06-12

## 2024-06-12 PROCEDURE — 11102 TANGNTL BX SKIN SINGLE LES: CPT

## 2024-06-12 PROCEDURE — ? BIOPSY BY SHAVE METHOD

## 2024-06-12 PROCEDURE — ? COUNSELING

## 2024-06-12 ASSESSMENT — LOCATION DETAILED DESCRIPTION DERM
LOCATION DETAILED: RIGHT INFERIOR ANTERIOR NECK
LOCATION DETAILED: RIGHT INFERIOR LATERAL NECK

## 2024-06-12 ASSESSMENT — LOCATION ZONE DERM: LOCATION ZONE: NECK

## 2024-06-12 ASSESSMENT — LOCATION SIMPLE DESCRIPTION DERM: LOCATION SIMPLE: RIGHT ANTERIOR NECK

## 2024-09-18 ENCOUNTER — APPOINTMENT (RX ONLY)
Dept: URBAN - METROPOLITAN AREA CLINIC 138 | Facility: CLINIC | Age: 54
Setting detail: DERMATOLOGY
End: 2024-09-18

## 2024-09-18 DIAGNOSIS — L82.0 INFLAMED SEBORRHEIC KERATOSIS: ICD-10-CM

## 2024-09-18 PROCEDURE — ? COUNSELING

## 2024-09-18 PROCEDURE — 17110 DESTRUCTION B9 LES UP TO 14: CPT

## 2024-09-18 PROCEDURE — ? LIQUID NITROGEN

## 2024-09-18 ASSESSMENT — LOCATION ZONE DERM: LOCATION ZONE: NECK

## 2024-09-18 ASSESSMENT — LOCATION SIMPLE DESCRIPTION DERM: LOCATION SIMPLE: NECK

## 2024-09-18 ASSESSMENT — LOCATION DETAILED DESCRIPTION DERM: LOCATION DETAILED: RIGHT INFERIOR LATERAL NECK

## 2024-09-18 NOTE — PROCEDURE: LIQUID NITROGEN
Spray Paint Technique: No
Medical Necessity Clause: This procedure was medically necessary because the lesions that were treated were:
Medical Necessity Information: It is in your best interest to select a reason for this procedure from the list below. All of these items fulfill various CMS LCD requirements except the new and changing color options.
Consent: The patient's consent was obtained including but not limited to risks of crusting, scabbing, blistering, scarring, darker or lighter pigmentary change, recurrence, incomplete removal and infection.
Detail Level: Detailed
Post-Care Instructions: I reviewed with the patient in detail post-care instructions. Patient is to wear sunprotection, and avoid picking at any of the treated lesions. Pt may apply Vaseline to crusted or scabbing areas.
Show Topical Anesthesia Variable?: Yes
Duration Of Freeze Thaw-Cycle (Seconds): 5-10
Spray Paint Text: The liquid nitrogen was applied to the skin utilizing a spray paint frosting technique.
Number Of Freeze-Thaw Cycles: 1 freeze-thaw cycle